# Patient Record
Sex: FEMALE | NOT HISPANIC OR LATINO
[De-identification: names, ages, dates, MRNs, and addresses within clinical notes are randomized per-mention and may not be internally consistent; named-entity substitution may affect disease eponyms.]

---

## 2021-08-24 PROBLEM — Z00.00 ENCOUNTER FOR PREVENTIVE HEALTH EXAMINATION: Status: ACTIVE | Noted: 2021-08-24

## 2021-08-25 ENCOUNTER — APPOINTMENT (OUTPATIENT)
Dept: PEDIATRIC ORTHOPEDIC SURGERY | Facility: CLINIC | Age: 46
End: 2021-08-25
Payer: COMMERCIAL

## 2021-08-25 VITALS — TEMPERATURE: 98.1 F | BODY MASS INDEX: 25.99 KG/M2 | WEIGHT: 156 LBS | HEIGHT: 65 IN

## 2021-08-25 DIAGNOSIS — M22.41 CHONDROMALACIA PATELLAE, RIGHT KNEE: ICD-10-CM

## 2021-08-25 DIAGNOSIS — M22.42 CHONDROMALACIA PATELLAE, LEFT KNEE: ICD-10-CM

## 2021-08-25 PROCEDURE — 73562 X-RAY EXAM OF KNEE 3: CPT | Mod: 50

## 2021-08-25 PROCEDURE — 99202 OFFICE O/P NEW SF 15 MIN: CPT

## 2021-08-25 NOTE — ASSESSMENT
[FreeTextEntry1] : Bilateral chondromalacia patella\par \par This patient will be treated with anti-inflammatory medication as well as patella cartilage knee braces.\par The patient will return on a as needed basis.\par \par Encounter time: 20 minutes

## 2021-08-25 NOTE — PHYSICAL EXAM
[de-identified] : On physical examination there is a full range of motion of the hips knees ankles and subtalar joints.  Hyperflexion of each knee causes pain in the patellofemoral joints.  There is clicking with flexion and extension of each knee.  In the patellofemoral joint.  There is no effusion and no varus valgus or AP instability she has negative patellar apprehension signs. [de-identified] : X-ray evaluation of right and left knees with patella views on 8/25/2021 (AP, lateral and patellar views) reveals no obvious abnormalities.\par Indication for bilateral knee x-rays: To determine the presence of arthritis or bony lesion

## 2021-08-25 NOTE — HISTORY OF PRESENT ILLNESS
[de-identified] : This 45-year-old female who works out with weights is here for evaluation of a 1 year history of bilateral anterior knee pain.  The patient does not recall a specific injury.  She does complain of a clicking sensation in each knee..  She does not complain of locking, swelling or giving way.

## 2024-05-25 ENCOUNTER — NON-APPOINTMENT (OUTPATIENT)
Age: 49
End: 2024-05-25

## 2025-01-30 ENCOUNTER — NON-APPOINTMENT (OUTPATIENT)
Age: 50
End: 2025-01-30